# Patient Record
Sex: FEMALE | URBAN - METROPOLITAN AREA
[De-identification: names, ages, dates, MRNs, and addresses within clinical notes are randomized per-mention and may not be internally consistent; named-entity substitution may affect disease eponyms.]

---

## 2019-09-04 ENCOUNTER — APPOINTMENT (RX ONLY)
Dept: URBAN - METROPOLITAN AREA CLINIC 106 | Facility: CLINIC | Age: 29
Setting detail: DERMATOLOGY
End: 2019-09-04

## 2019-09-04 DIAGNOSIS — D22 MELANOCYTIC NEVI: ICD-10-CM

## 2019-09-04 DIAGNOSIS — L81.4 OTHER MELANIN HYPERPIGMENTATION: ICD-10-CM

## 2019-09-04 DIAGNOSIS — L71.8 OTHER ROSACEA: ICD-10-CM

## 2019-09-04 DIAGNOSIS — B35.1 TINEA UNGUIUM: ICD-10-CM

## 2019-09-04 DIAGNOSIS — L21.8 OTHER SEBORRHEIC DERMATITIS: ICD-10-CM

## 2019-09-04 PROBLEM — D22.5 MELANOCYTIC NEVI OF TRUNK: Status: ACTIVE | Noted: 2019-09-04

## 2019-09-04 PROCEDURE — ? COUNSELING

## 2019-09-04 PROCEDURE — ? DIAGNOSIS COMMENT

## 2019-09-04 PROCEDURE — ? PRESCRIPTION

## 2019-09-04 PROCEDURE — 99214 OFFICE O/P EST MOD 30 MIN: CPT

## 2019-09-04 RX ORDER — EFINACONAZOLE 100 MG/ML
SOLUTION TOPICAL
Qty: 1 | Refills: 5 | Status: ERX | COMMUNITY
Start: 2019-09-04

## 2019-09-04 RX ORDER — IVERMECTIN 10 MG/G
CREAM TOPICAL
Qty: 1 | Refills: 5 | Status: ERX | COMMUNITY
Start: 2019-09-04

## 2019-09-04 RX ORDER — DOXYCYCLINE 40 MG/1
CAPSULE ORAL
Qty: 30 | Refills: 5 | Status: ERX | COMMUNITY
Start: 2019-09-04

## 2019-09-04 RX ORDER — KETOCONAZOLE 20.5 MG/ML
SHAMPOO, SUSPENSION TOPICAL
Qty: 1 | Refills: 5 | Status: ERX | COMMUNITY
Start: 2019-09-04

## 2019-09-04 RX ADMIN — KETOCONAZOLE: 20.5 SHAMPOO, SUSPENSION TOPICAL at 13:47

## 2019-09-04 RX ADMIN — DOXYCYCLINE: 40 CAPSULE ORAL at 13:45

## 2019-09-04 RX ADMIN — EFINACONAZOLE: 100 SOLUTION TOPICAL at 13:52

## 2019-09-04 RX ADMIN — IVERMECTIN: 10 CREAM TOPICAL at 13:45

## 2019-09-04 ASSESSMENT — LOCATION DETAILED DESCRIPTION DERM
LOCATION DETAILED: RIGHT SUPERIOR PARIETAL SCALP
LOCATION DETAILED: RIGHT CENTRAL MALAR CHEEK
LOCATION DETAILED: RIGHT GREAT TOENAIL
LOCATION DETAILED: LEFT ANTERIOR SHOULDER
LOCATION DETAILED: LEFT MEDIAL UPPER BACK
LOCATION DETAILED: LEFT GREAT TOENAIL

## 2019-09-04 ASSESSMENT — LOCATION ZONE DERM
LOCATION ZONE: FACE
LOCATION ZONE: SCALP
LOCATION ZONE: ARM
LOCATION ZONE: TOENAIL
LOCATION ZONE: TRUNK

## 2019-09-04 ASSESSMENT — LOCATION SIMPLE DESCRIPTION DERM
LOCATION SIMPLE: LEFT GREAT TOE
LOCATION SIMPLE: RIGHT CHEEK
LOCATION SIMPLE: LEFT SHOULDER
LOCATION SIMPLE: SCALP
LOCATION SIMPLE: RIGHT GREAT TOE
LOCATION SIMPLE: LEFT UPPER BACK

## 2019-09-04 NOTE — PROCEDURE: MIPS QUALITY
Quality 110: Preventive Care And Screening: Influenza Immunization: Influenza Immunization previously received during influenza season
Quality 226: Preventive Care And Screening: Tobacco Use: Screening And Cessation Intervention: Patient screened for tobacco use and is an ex/non-smoker
Detail Level: Detailed
Quality 131: Pain Assessment And Follow-Up: Pain assessment NOT documented as being performed, documentation the patient is not eligible for a pain assessment using a standardized tool
Quality 130: Documentation Of Current Medications In The Medical Record: Current Medications Documented

## 2019-10-07 ENCOUNTER — APPOINTMENT (RX ONLY)
Dept: URBAN - METROPOLITAN AREA CLINIC 115 | Facility: CLINIC | Age: 29
Setting detail: DERMATOLOGY
End: 2019-10-07

## 2019-10-07 DIAGNOSIS — L71.8 OTHER ROSACEA: ICD-10-CM

## 2019-10-07 PROCEDURE — ? PRESCRIPTION

## 2019-10-07 PROCEDURE — ? DIAGNOSIS COMMENT

## 2019-10-07 PROCEDURE — 99213 OFFICE O/P EST LOW 20 MIN: CPT

## 2019-10-07 PROCEDURE — ? TREATMENT REGIMEN

## 2019-10-07 PROCEDURE — ? COUNSELING

## 2019-10-07 RX ORDER — DOXYCYCLINE HYCLATE 50 MG/1
TABLET ORAL
Qty: 60 | Refills: 4 | Status: ERX | COMMUNITY
Start: 2019-10-07

## 2019-10-07 RX ADMIN — DOXYCYCLINE HYCLATE: 50 TABLET ORAL at 19:23

## 2019-10-07 ASSESSMENT — LOCATION SIMPLE DESCRIPTION DERM: LOCATION SIMPLE: RIGHT CHEEK

## 2019-10-07 ASSESSMENT — LOCATION DETAILED DESCRIPTION DERM: LOCATION DETAILED: RIGHT CENTRAL MALAR CHEEK

## 2019-10-07 ASSESSMENT — LOCATION ZONE DERM: LOCATION ZONE: FACE

## 2019-10-07 NOTE — PROCEDURE: MIPS QUALITY
Quality 226: Preventive Care And Screening: Tobacco Use: Screening And Cessation Intervention: Patient screened for tobacco use and is an ex/non-smoker
Detail Level: Detailed
Quality 130: Documentation Of Current Medications In The Medical Record: Current Medications Documented
Quality 131: Pain Assessment And Follow-Up: Pain assessment NOT documented as being performed, documentation the patient is not eligible for a pain assessment using a standardized tool
Quality 110: Preventive Care And Screening: Influenza Immunization: Influenza Immunization Administered during Influenza season

## 2019-10-07 NOTE — PROCEDURE: TREATMENT REGIMEN
Initiate Treatment: Targadox 50 mg tablet
Samples Given: Rhofade
Continue Regimen: soolantra
Detail Level: Zone
Discontinue Regimen: Oracea

## 2020-10-19 ENCOUNTER — RX ONLY (OUTPATIENT)
Age: 30
Setting detail: RX ONLY
End: 2020-10-19

## 2020-10-19 ENCOUNTER — APPOINTMENT (RX ONLY)
Dept: URBAN - METROPOLITAN AREA CLINIC 115 | Facility: CLINIC | Age: 30
Setting detail: DERMATOLOGY
End: 2020-10-19

## 2020-10-19 VITALS — TEMPERATURE: 97.9 F

## 2020-10-19 DIAGNOSIS — L71.8 OTHER ROSACEA: ICD-10-CM | Status: WELL CONTROLLED

## 2020-10-19 DIAGNOSIS — L21.8 OTHER SEBORRHEIC DERMATITIS: ICD-10-CM

## 2020-10-19 DIAGNOSIS — D22 MELANOCYTIC NEVI: ICD-10-CM

## 2020-10-19 DIAGNOSIS — B35.1 TINEA UNGUIUM: ICD-10-CM | Status: RESOLVED

## 2020-10-19 DIAGNOSIS — L81.4 OTHER MELANIN HYPERPIGMENTATION: ICD-10-CM

## 2020-10-19 PROBLEM — D48.5 NEOPLASM OF UNCERTAIN BEHAVIOR OF SKIN: Status: ACTIVE | Noted: 2020-10-19

## 2020-10-19 PROBLEM — D22.5 MELANOCYTIC NEVI OF TRUNK: Status: ACTIVE | Noted: 2020-10-19

## 2020-10-19 PROCEDURE — ? BIOPSY BY SHAVE METHOD

## 2020-10-19 PROCEDURE — ? SUNSCREEN RECOMMENDATIONS

## 2020-10-19 PROCEDURE — ? TREATMENT REGIMEN

## 2020-10-19 PROCEDURE — ? PRESCRIPTION

## 2020-10-19 PROCEDURE — 99214 OFFICE O/P EST MOD 30 MIN: CPT | Mod: 25

## 2020-10-19 PROCEDURE — ? COUNSELING

## 2020-10-19 PROCEDURE — 11102 TANGNTL BX SKIN SINGLE LES: CPT

## 2020-10-19 RX ORDER — KETOCONAZOLE 20 MG/ML
SHAMPOO, SUSPENSION TOPICAL
Qty: 3 | Refills: 8 | Status: ERX

## 2020-10-19 RX ORDER — IVERMECTIN 10 MG/G
CREAM TOPICAL
Qty: 1 | Refills: 5 | Status: ERX

## 2020-10-19 RX ORDER — KETOCONAZOLE 20 MG/G
CREAM TOPICAL BID
Qty: 1 | Refills: 3 | Status: ERX | COMMUNITY
Start: 2020-10-19

## 2020-10-19 RX ORDER — EFINACONAZOLE 100 MG/ML
SOLUTION TOPICAL
Qty: 1 | Refills: 5 | Status: ERX

## 2020-10-19 RX ADMIN — KETOCONAZOLE: 20 CREAM TOPICAL at 00:00

## 2020-10-19 ASSESSMENT — LOCATION SIMPLE DESCRIPTION DERM
LOCATION SIMPLE: RIGHT GREAT TOE
LOCATION SIMPLE: UPPER BACK
LOCATION SIMPLE: RIGHT EAR
LOCATION SIMPLE: LEFT GREAT TOE
LOCATION SIMPLE: LEFT EAR
LOCATION SIMPLE: RIGHT POSTERIOR UPPER ARM
LOCATION SIMPLE: RIGHT CHEEK
LOCATION SIMPLE: SCALP

## 2020-10-19 ASSESSMENT — LOCATION DETAILED DESCRIPTION DERM
LOCATION DETAILED: RIGHT GREAT TOENAIL
LOCATION DETAILED: LEFT SUPERIOR PARIETAL SCALP
LOCATION DETAILED: SUPERIOR THORACIC SPINE
LOCATION DETAILED: RIGHT CAVUM CONCHA
LOCATION DETAILED: LEFT CAVUM CONCHA
LOCATION DETAILED: RIGHT DISTAL POSTERIOR UPPER ARM
LOCATION DETAILED: LEFT GREAT TOENAIL
LOCATION DETAILED: RIGHT CENTRAL MALAR CHEEK

## 2020-10-19 ASSESSMENT — LOCATION ZONE DERM
LOCATION ZONE: TRUNK
LOCATION ZONE: FACE
LOCATION ZONE: EAR
LOCATION ZONE: SCALP
LOCATION ZONE: TOENAIL
LOCATION ZONE: ARM

## 2020-10-19 NOTE — PROCEDURE: TREATMENT REGIMEN
Continue Regimen: soolantra
Detail Level: Zone
Discontinue Regimen: Jublia
Initiate Treatment: ketoconazole 2 % topical cream BID\\nDays Supply: 30\\nSig: Apply twice daily to ears prn flares\\n\\nketoconazole 2 % shampoo Three times a week\\nDays Supply: 30\\nSig: Wash hair 2-3 times a week leave in 5-10 mins, wash off

## 2020-10-19 NOTE — PROCEDURE: BIOPSY BY SHAVE METHOD
Detail Level: Detailed
Depth Of Biopsy: dermis
Was A Bandage Applied: Yes
Size Of Lesion In Cm: 0.3
X Size Of Lesion In Cm: 0.2
Biopsy Type: H and E
Biopsy Method: double edge Personna blade
Anesthesia Type: 1% lidocaine with epinephrine
Anesthesia Volume In Cc (Will Not Render If 0): 0.5
Additional Anesthesia Volume In Cc (Will Not Render If 0): 0
Hemostasis: Aluminum Chloride
Wound Care: Aquaphor
Dressing: bandage
Destruction After The Procedure: No
Type Of Destruction Used: Curettage
Curettage Text: The wound bed was treated with curettage after the biopsy was performed.
Cryotherapy Text: The wound bed was treated with cryotherapy after the biopsy was performed.
Electrodesiccation Text: The wound bed was treated with electrodesiccation after the biopsy was performed.
Electrodesiccation And Curettage Text: The wound bed was treated with electrodesiccation and curettage after the biopsy was performed.
Silver Nitrate Text: The wound bed was treated with silver nitrate after the biopsy was performed.
Lab: [id number]
Lab Facility: [id number]
Consent: Verbal consent was obtained and risks were reviewed including but not limited to scarring, infection, bleeding, scabbing, incomplete removal, nerve damage and allergy to anesthesia.
Post-Care Instructions: I reviewed with the patient in detail post-care instructions. Patient is to keep the biopsy site dry overnight, and then apply bacitracin twice daily until healed. Patient may apply hydrogen peroxide soaks to remove any crusting.
Notification Instructions: Patient will be notified of biopsy results. However, patient instructed to call the office if not contacted within 2 weeks.
Billing Type: Third-Party Bill
Information: Selecting Yes will display possible errors in your note based on the variables you have selected. This validation is only offered as a suggestion for you. PLEASE NOTE THAT THE VALIDATION TEXT WILL BE REMOVED WHEN YOU FINALIZE YOUR NOTE. IF YOU WANT TO FAX A PRELIMINARY NOTE YOU WILL NEED TO TOGGLE THIS TO 'NO' IF YOU DO NOT WANT IT IN YOUR FAXED NOTE.